# Patient Record
Sex: MALE | Race: OTHER | HISPANIC OR LATINO | URBAN - METROPOLITAN AREA
[De-identification: names, ages, dates, MRNs, and addresses within clinical notes are randomized per-mention and may not be internally consistent; named-entity substitution may affect disease eponyms.]

---

## 2018-08-19 ENCOUNTER — EMERGENCY (EMERGENCY)
Facility: HOSPITAL | Age: 33
LOS: 1 days | Discharge: HOME | End: 2018-08-19
Attending: EMERGENCY MEDICINE | Admitting: EMERGENCY MEDICINE
Payer: SELF-PAY

## 2018-08-19 VITALS
SYSTOLIC BLOOD PRESSURE: 125 MMHG | TEMPERATURE: 98 F | WEIGHT: 315 LBS | RESPIRATION RATE: 16 BRPM | DIASTOLIC BLOOD PRESSURE: 83 MMHG | OXYGEN SATURATION: 97 % | HEART RATE: 74 BPM

## 2018-08-19 DIAGNOSIS — R06.02 SHORTNESS OF BREATH: ICD-10-CM

## 2018-08-19 DIAGNOSIS — F17.200 NICOTINE DEPENDENCE, UNSPECIFIED, UNCOMPLICATED: ICD-10-CM

## 2018-08-19 DIAGNOSIS — M79.89 OTHER SPECIFIED SOFT TISSUE DISORDERS: ICD-10-CM

## 2018-08-19 LAB
ALBUMIN SERPL ELPH-MCNC: 3.7 G/DL — SIGNIFICANT CHANGE UP (ref 3.4–5)
ALP SERPL-CCNC: 61 U/L — SIGNIFICANT CHANGE UP (ref 40–120)
ALT FLD-CCNC: 34 U/L — SIGNIFICANT CHANGE UP (ref 12–42)
ANION GAP SERPL CALC-SCNC: 6 MMOL/L — LOW (ref 9–16)
AST SERPL-CCNC: 18 U/L — SIGNIFICANT CHANGE UP (ref 15–37)
BILIRUB SERPL-MCNC: 0.5 MG/DL — SIGNIFICANT CHANGE UP (ref 0.2–1.2)
BUN SERPL-MCNC: 12 MG/DL — SIGNIFICANT CHANGE UP (ref 7–23)
CALCIUM SERPL-MCNC: 9.1 MG/DL — SIGNIFICANT CHANGE UP (ref 8.5–10.5)
CHLORIDE SERPL-SCNC: 105 MMOL/L — SIGNIFICANT CHANGE UP (ref 96–108)
CO2 SERPL-SCNC: 31 MMOL/L — SIGNIFICANT CHANGE UP (ref 22–31)
CREAT SERPL-MCNC: 0.99 MG/DL — SIGNIFICANT CHANGE UP (ref 0.5–1.3)
D DIMER BLD IA.RAPID-MCNC: 211 NG/ML DDU — SIGNIFICANT CHANGE UP
GLUCOSE SERPL-MCNC: 82 MG/DL — SIGNIFICANT CHANGE UP (ref 70–99)
HCT VFR BLD CALC: 45.6 % — SIGNIFICANT CHANGE UP (ref 39–50)
HGB BLD-MCNC: 15.2 G/DL — SIGNIFICANT CHANGE UP (ref 13–17)
MCHC RBC-ENTMCNC: 29 PG — SIGNIFICANT CHANGE UP (ref 27–34)
MCHC RBC-ENTMCNC: 33.3 G/DL — SIGNIFICANT CHANGE UP (ref 32–36)
MCV RBC AUTO: 86.9 FL — SIGNIFICANT CHANGE UP (ref 80–100)
NT-PROBNP SERPL-SCNC: 30 PG/ML — SIGNIFICANT CHANGE UP
PLATELET # BLD AUTO: 236 K/UL — SIGNIFICANT CHANGE UP (ref 150–400)
POTASSIUM SERPL-MCNC: 4.2 MMOL/L — SIGNIFICANT CHANGE UP (ref 3.5–5.3)
POTASSIUM SERPL-SCNC: 4.2 MMOL/L — SIGNIFICANT CHANGE UP (ref 3.5–5.3)
PROT SERPL-MCNC: 7.8 G/DL — SIGNIFICANT CHANGE UP (ref 6.4–8.2)
RBC # BLD: 5.25 M/UL — SIGNIFICANT CHANGE UP (ref 4.2–5.8)
RBC # FLD: 12.4 % — SIGNIFICANT CHANGE UP (ref 10.3–16.9)
SODIUM SERPL-SCNC: 142 MMOL/L — SIGNIFICANT CHANGE UP (ref 132–145)
TROPONIN I SERPL-MCNC: <0.017 NG/ML — LOW (ref 0.02–0.06)
WBC # BLD: 11.8 K/UL — HIGH (ref 3.8–10.5)
WBC # FLD AUTO: 11.8 K/UL — HIGH (ref 3.8–10.5)

## 2018-08-19 PROCEDURE — 93010 ELECTROCARDIOGRAM REPORT: CPT

## 2018-08-19 PROCEDURE — 71046 X-RAY EXAM CHEST 2 VIEWS: CPT | Mod: 26

## 2018-08-19 PROCEDURE — 93971 EXTREMITY STUDY: CPT | Mod: 26,RT

## 2018-08-19 PROCEDURE — 99285 EMERGENCY DEPT VISIT HI MDM: CPT | Mod: 25

## 2018-08-19 RX ORDER — SODIUM CHLORIDE 9 MG/ML
3 INJECTION INTRAMUSCULAR; INTRAVENOUS; SUBCUTANEOUS EVERY 8 HOURS
Qty: 0 | Refills: 0 | Status: DISCONTINUED | OUTPATIENT
Start: 2018-08-19 | End: 2018-08-19

## 2018-08-19 NOTE — ED ADULT NURSE NOTE - NSIMPLEMENTINTERV_GEN_ALL_ED
Implemented All Universal Safety Interventions:  [place] to call system. Call bell, personal items and telephone within reach. Instruct patient to call for assistance. Room bathroom lighting operational. Non-slip footwear when patient is off stretcher. Physically safe environment: no spills, clutter or unnecessary equipment. Stretcher in lowest position, wheels locked, appropriate side rails in place.

## 2018-08-19 NOTE — ED PROVIDER NOTE - OBJECTIVE STATEMENT
morbidly obese male smoker presents with two complaints: 1) RLE swelling x 2 weeks, mild, constant.  Denies pain or redness.  Sedentary job.  Denies driving long distances.  2) Shortness of breath > 1 year.  With minimal exertion (unchanged from baseline).  Denies chest pain, orthopnea.  Does not have a PMD.  Denies family hx of PE/DVT.  Denies recent surgery or travel history.  Was telling a friend about his symptoms last week which prompted him to come to the ED today.  Accompanied by mom

## 2018-08-19 NOTE — ED PROVIDER NOTE - DIAGNOSTIC INTERPRETATION
ER Physician: Nolan Martinez  CHEST XRAY INTERPRETATION: lungs clear, heart shadow normal, bony structures intact

## 2018-08-19 NOTE — ED ADULT NURSE NOTE - OBJECTIVE STATEMENT
Patient complaining of RLE swelling and shortness of breath on excretion, patient with a h/o obese and smoking

## 2018-08-19 NOTE — ED PROVIDER NOTE - DISCUSSED CLINICAL AND RADIOLOGICAL FINDINGS WITH, MDM
Male Completion Statement: After discussing his treatment course we decided to discontinue isotretinoin therapy at this time. He shouldn't donate blood for one month after the last dose. He should call with any new symptoms of depression. patient

## 2018-08-19 NOTE — ED PROVIDER NOTE - PROGRESS NOTE DETAILS
labs unremarkable.  CXR clear, no DVT seen on US.  D/w patient in detail.  Symptoms likely due to sedentary job/body habitus.  Will give referral for PMD.  Conservative management discussed with the patient in detail.  Close PMD follow up encouraged.  Strict ED return instructions discussed in detail and patient given the opportunity to ask any questions about their discharge diagnosis and instructions

## 2018-08-19 NOTE — ED PROVIDER NOTE - MEDICAL DECISION MAKING DETAILS
SOB/AYALA present > 1 year.  No hypoxia or tachycardia.  Normal WOB, no tachypnea, clear lungs.  RLE swelling new as per patient x 2 weeks without DVT risk factors.  1+ pitting edema symmetric with normal distal neurovascular exam and no calf tenderness.  EKG, labs, cxr, d-dimer, US ordered SOB/AYALA present > 1 year.  No hypoxia or tachycardia.  Normal WOB, no tachypnea, clear lungs.  RLE swelling new as per patient x 2 weeks without DVT risk factors.  1+ pitting edema symmetric with normal distal neurovascular exam and no calf tenderness.  EKG, labs, cxr, d-dimer, US ordered.  Very low suspicion for PE/DVT.  No exam findings to suggest chf, pna.

## 2018-08-19 NOTE — ED PROVIDER NOTE - RESPIRATORY, MLM
Breath sounds clear and equal bilaterally.  Normal WOB, no tachypnea, speaking in full/clear sentences.

## 2019-12-21 ENCOUNTER — EMERGENCY (EMERGENCY)
Facility: HOSPITAL | Age: 34
LOS: 1 days | Discharge: HOME | End: 2019-12-21
Admitting: EMERGENCY MEDICINE
Payer: SELF-PAY

## 2019-12-21 VITALS
DIASTOLIC BLOOD PRESSURE: 80 MMHG | RESPIRATION RATE: 18 BRPM | HEIGHT: 73 IN | HEART RATE: 87 BPM | TEMPERATURE: 98 F | SYSTOLIC BLOOD PRESSURE: 129 MMHG | WEIGHT: 315 LBS | OXYGEN SATURATION: 97 %

## 2019-12-21 VITALS
DIASTOLIC BLOOD PRESSURE: 84 MMHG | OXYGEN SATURATION: 98 % | SYSTOLIC BLOOD PRESSURE: 139 MMHG | TEMPERATURE: 98 F | HEART RATE: 70 BPM | RESPIRATION RATE: 20 BRPM

## 2019-12-21 LAB
ALBUMIN SERPL ELPH-MCNC: 3.7 G/DL — SIGNIFICANT CHANGE UP (ref 3.4–5)
ALP SERPL-CCNC: 52 U/L — SIGNIFICANT CHANGE UP (ref 40–120)
ALT FLD-CCNC: 47 U/L — HIGH (ref 12–42)
ANION GAP SERPL CALC-SCNC: 4 MMOL/L — LOW (ref 9–16)
AST SERPL-CCNC: 33 U/L — SIGNIFICANT CHANGE UP (ref 15–37)
BILIRUB SERPL-MCNC: 0.4 MG/DL — SIGNIFICANT CHANGE UP (ref 0.2–1.2)
BUN SERPL-MCNC: 11 MG/DL — SIGNIFICANT CHANGE UP (ref 7–23)
CALCIUM SERPL-MCNC: 8.4 MG/DL — LOW (ref 8.5–10.5)
CHLORIDE SERPL-SCNC: 101 MMOL/L — SIGNIFICANT CHANGE UP (ref 96–108)
CO2 SERPL-SCNC: 35 MMOL/L — HIGH (ref 22–31)
CREAT SERPL-MCNC: 0.95 MG/DL — SIGNIFICANT CHANGE UP (ref 0.5–1.3)
GLUCOSE SERPL-MCNC: 101 MG/DL — HIGH (ref 70–99)
HCT VFR BLD CALC: 46.2 % — SIGNIFICANT CHANGE UP (ref 39–50)
HGB BLD-MCNC: 15.7 G/DL — SIGNIFICANT CHANGE UP (ref 13–17)
MAGNESIUM SERPL-MCNC: 2.3 MG/DL — SIGNIFICANT CHANGE UP (ref 1.6–2.6)
MCHC RBC-ENTMCNC: 29.1 PG — SIGNIFICANT CHANGE UP (ref 27–34)
MCHC RBC-ENTMCNC: 34 GM/DL — SIGNIFICANT CHANGE UP (ref 32–36)
MCV RBC AUTO: 85.6 FL — SIGNIFICANT CHANGE UP (ref 80–100)
NRBC # BLD: 0 /100 WBCS — SIGNIFICANT CHANGE UP (ref 0–0)
PLATELET # BLD AUTO: 169 K/UL — SIGNIFICANT CHANGE UP (ref 150–400)
POTASSIUM SERPL-MCNC: 3.7 MMOL/L — SIGNIFICANT CHANGE UP (ref 3.5–5.3)
POTASSIUM SERPL-SCNC: 3.7 MMOL/L — SIGNIFICANT CHANGE UP (ref 3.5–5.3)
PROT SERPL-MCNC: 7.8 G/DL — SIGNIFICANT CHANGE UP (ref 6.4–8.2)
RBC # BLD: 5.4 M/UL — SIGNIFICANT CHANGE UP (ref 4.2–5.8)
RBC # FLD: 12.1 % — SIGNIFICANT CHANGE UP (ref 10.3–14.5)
SODIUM SERPL-SCNC: 140 MMOL/L — SIGNIFICANT CHANGE UP (ref 132–145)
TROPONIN I SERPL-MCNC: <0.017 NG/ML — LOW (ref 0.02–0.06)
TSH SERPL-MCNC: 0.8 UIU/ML — SIGNIFICANT CHANGE UP (ref 0.36–3.74)
WBC # BLD: 5.12 K/UL — SIGNIFICANT CHANGE UP (ref 3.8–10.5)
WBC # FLD AUTO: 5.12 K/UL — SIGNIFICANT CHANGE UP (ref 3.8–10.5)

## 2019-12-21 PROCEDURE — 99285 EMERGENCY DEPT VISIT HI MDM: CPT | Mod: 25

## 2019-12-21 PROCEDURE — 71046 X-RAY EXAM CHEST 2 VIEWS: CPT | Mod: 26

## 2019-12-21 PROCEDURE — 93010 ELECTROCARDIOGRAM REPORT: CPT

## 2019-12-21 NOTE — ED PROVIDER NOTE - PMH
GERD (gastroesophageal reflux disease)    No pertinent past medical history GERD (gastroesophageal reflux disease)

## 2019-12-21 NOTE — ED PROVIDER NOTE - PATIENT PORTAL LINK FT
You can access the FollowMyHealth Patient Portal offered by [facility] by registering at the following website: [email or web address]. By joining [facility]’s FollowMyHealth portal, you will also be able to view your health information using other applications (apps) compatible with our system.

## 2019-12-21 NOTE — ED ADULT NURSE NOTE - CHIEF COMPLAINT QUOTE
"My heart feels like its twitching or something, I'm also feel light headed and dizzy, and I feel like I'm easily distracted." c/o non radiating chest pain, no resp distress, no n/v. additionally c/o cough/cold symptoms x1week, nonproductive cough, tactile fever at home. well appearing. nad. aox3 verbal, norman. accompanied with mother. hx asthma

## 2019-12-21 NOTE — ED PROVIDER NOTE - CARE PROVIDER_API CALL
Terence Cook)  Cardiovascular Disease  [address] [facility] [place] [address]  Phone: [phone]  Fax: [phone]  Follow Up Time:

## 2019-12-21 NOTE — ED PROVIDER NOTE - DIAGNOSTIC INTERPRETATION
Interpreted by ED YIN Bonilla   Chest x-ray, 2 views  Mild cardiomegaly. No PNA or acute processes noted.

## 2019-12-21 NOTE — ED ADULT NURSE NOTE - OBJECTIVE STATEMENT
Pt with c/o intermittent palpitations x2 months. Reports experiencing intermittent dizziness since yesterday.  Calm and conversant in ED. No palpitations at time of assessment

## 2019-12-21 NOTE — ED PROVIDER NOTE - TIMING
Received phone call from nurse that pt did not stop Plavix will need to be rescheduled.  Contacted pt at [phone] she apologized that she forgot to stop it.  Rescheduled from [id number] to [id number] same time of 1100.  Staff messages to [facility] Scheduling Pool and  PreAuth pool.     gradual onset

## 2019-12-21 NOTE — ED PROVIDER NOTE - CLINICAL SUMMARY MEDICAL DECISION MAKING FREE TEXT BOX
Pt with 2 months of intermittent palpitations. Seen outpatient by PCP and in ED 3 days ago with normal workups. States earlier this week he had flu-like sxs which have since resolved - however episodes of palpitations have increased. Denies shortness of breath, syncope, near syncope. Will get EKG, CXR, trop, mag, cbc, cmp. Will re-eval. likely will need outpatient cards f/u

## 2019-12-21 NOTE — ED PROVIDER NOTE - NSFOLLOWUPINSTRUCTIONS_ED_ALL_ED_FT
-PLEASE FOLLOW-UP WITH YOUR PRIMARY CARE DOCTOR IN 1-2 DAYS.  BRING ALL PAPERWORK FROM TODAY'S VISIT TO YOUR FOLLOW-UP VISIT.  IF YOU DO NOT HAVE A PRIMARY CARE DOCTOR PLEASE REFER TO THE OFFICE/CLINIC INFORMATION GIVEN ABOVE.  YOU MAY ALSO CALL [phone] AND ASK FOR MSAlejandro SOHAIL KARIMI.  SHE CAN HELP YOU MAKE A FOLLOW-UP APPOINTMENT.  HER HOURS ARE 11AM-7PM MONDAY - FRIDAY.  -TAKE OVER THE COUNTER TYLENOL 650MG BY MOUTH EVERY 4-6 HOURS AS NEEDED FOR PAIN.  DO NOT MIX WITH ALCOHOL OR OTHER PRESCRIPTION MEDICATIONS THAT ALREADY CONTAIN TYLENOL OR ACETAMINOPHEN.   -TAKE OVER THE COUNTER IBUPROFEN 400-600MG BY MOUTH EVERY 8 HOURS AS NEEDED FOR PAIN.  BE SURE TO TAKE WITH FOOD OR MILK AS THIS MEDICATION CAN CAUSE STOMACH IRRITATION.  -PLEASE RETURN TO THE ER IMMEDIATELY OR CALL 911 FOR ANY HIGH FEVER, TROUBLE BREATHING, VOMITING, SEVERE PAIN, OR ANY OTHER CONCERNS.     Palpitations    A palpitation is the feeling that your heartbeat is irregular or is faster than normal. It may feel like your heart is fluttering or skipping a beat. They may be caused by many things, including smoking, caffeine, alcohol, stress, and certain medicines. Although most causes of palpitations are not serious, palpitations can be a sign of a serious medical problem. Avoid caffeine, alcohol, and tobacco products at home. Try to reduce stress and anxiety and make sure to get plenty of rest.     SEEK IMMEDIATE MEDICAL CARE IF YOU HAVE ANY OF THE FOLLOWING SYMPTOMS: chest pain, shortness of breath, severe headache, dizziness/lightheadedness, or fainting.

## 2019-12-21 NOTE — ED PROVIDER NOTE - OBJECTIVE STATEMENT
33 y/o male with PMHx of GERD (taking Omeprazole).     Pt presents to the ED with complaints of non-radiating chest pain x 3 months. Pt states 6 days ago, he developed flu-like symptoms, including non-productive cough, subjective fevers, generalized weakness, decrease appetite, and body aches, which have since subsided. In the interim, Pt endorses taking several doses of DayQuil and was seen at [facility] yesterday for concerns of liver damage. Today, Pt is c/o persistent palpitations and intermittent lightheadedness, which is independent of the palpitations.           Denies fever, chills, SOB, abdominal pain, nausea, vomiting 33 y/o male with PMHx of GERD (taking Omeprazole).     Pt presents to the ED with complaints of non-radiating chest pain x 3 months. Pt states 6 days ago, he developed flu-like symptoms, including non-productive cough, tactile fevers, generalized weakness, decreased appetite, and body aches, which have since subsided. In the interim, Pt endorses taking several doses of DayQuil and was seen at [facility] yesterday for concerns of liver damage. Today, Pt is c/o persistent palpitations and intermittent lightheadedness that is independent of the palpitations, which prompted ED visit today. Of note, Pt states he has been having palpitations since September in the setting of vaping use with nicotine, which he has currently stopped. Initially, palpitation were constant but have become intermittent over the past 2 weeks. Episodes lasted between 10-15 mins and after every few beats. Denies orthostatic hypotension. Denies fever, chills, SOB, abdominal pain, nausea, vomiting, headache.

## 2019-12-21 NOTE — ED ADULT TRIAGE NOTE - CHIEF COMPLAINT QUOTE
"My heart feels like its twitching or something, I'm also feel light headed and dizzy, also easily distracted." c/o non radiating chest pain, no resp distress, no n/v. additionally c/o cough/cold symptoms x1week, nonproductive cough, tactile fever at home. well appearing. nad. accompanied with mother. hx asthma "My heart feels like its twitching or something, I'm also feel light headed and dizzy, and I feel like I'm easily distracted." c/o non radiating chest pain, no resp distress, no n/v. additionally c/o cough/cold symptoms x1week, nonproductive cough, tactile fever at home. well appearing. nad. accompanied with mother. hx asthma "My heart feels like its twitching or something, I'm also feel light headed and dizzy, and I feel like I'm easily distracted." c/o non radiating chest pain, no resp distress, no n/v. additionally c/o cough/cold symptoms x1week, nonproductive cough, tactile fever at home. well appearing. nad. aox3 verbal, norman. accompanied with mother. hx asthma

## 2019-12-21 NOTE — ED PROVIDER NOTE - PROGRESS NOTE DETAILS
EKG, CXR, trop, and labs all unremarkable. Tele normal during stay. Will Dc at this time with strict return precautions. Will have pt follow up with Dr. Cook for possible Holter monitor. Pt has not had palpitations since prior to arrival. States he is feeling better and will f/u outpatient.

## 2019-12-23 PROBLEM — K21.9 GASTRO-ESOPHAGEAL REFLUX DISEASE WITHOUT ESOPHAGITIS: Chronic | Status: ACTIVE | Noted: 2019-12-21

## 2019-12-24 PROBLEM — Z00.00 ENCOUNTER FOR PREVENTIVE HEALTH EXAMINATION: Status: ACTIVE | Noted: 2019-12-24

## 2019-12-25 DIAGNOSIS — Z87.891 PERSONAL HISTORY OF NICOTINE DEPENDENCE: ICD-10-CM

## 2019-12-25 DIAGNOSIS — R42 DIZZINESS AND GIDDINESS: ICD-10-CM

## 2019-12-25 DIAGNOSIS — R07.9 CHEST PAIN, UNSPECIFIED: ICD-10-CM

## 2019-12-25 DIAGNOSIS — R00.2 PALPITATIONS: ICD-10-CM

## 2019-12-25 DIAGNOSIS — R53.1 WEAKNESS: ICD-10-CM

## 2020-01-02 ENCOUNTER — APPOINTMENT (OUTPATIENT)
Dept: HEART AND VASCULAR | Facility: CLINIC | Age: 35
End: 2020-01-02

## 2020-01-03 ENCOUNTER — APPOINTMENT (OUTPATIENT)
Dept: HEART AND VASCULAR | Facility: CLINIC | Age: 35
End: 2020-01-03

## 2020-05-08 ENCOUNTER — EMERGENCY (EMERGENCY)
Facility: HOSPITAL | Age: 35
LOS: 1 days | Discharge: HOME | End: 2020-05-08
Attending: EMERGENCY MEDICINE | Admitting: EMERGENCY MEDICINE
Payer: SELF-PAY

## 2020-05-08 VITALS
TEMPERATURE: 98 F | OXYGEN SATURATION: 98 % | RESPIRATION RATE: 18 BRPM | SYSTOLIC BLOOD PRESSURE: 120 MMHG | DIASTOLIC BLOOD PRESSURE: 80 MMHG | HEART RATE: 86 BPM | HEIGHT: 73 IN | WEIGHT: 315 LBS
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
## 2020-05-08 DIAGNOSIS — T78.1XXA OTHER ADVERSE FOOD REACTIONS, NOT ELSEWHERE CLASSIFIED, INITIAL ENCOUNTER: ICD-10-CM

## 2020-05-08 PROCEDURE — 99284 EMERGENCY DEPT VISIT MOD MDM: CPT

## 2020-05-08 RX ORDER — DEXAMETHASONE 0.5 MG/5ML
10 ELIXIR ORAL ONCE
Refills: 0 | Status: COMPLETED | OUTPATIENT
Start: 2020-05-08 | End: 2020-05-08

## 2020-05-08 RX ORDER — FAMOTIDINE 10 MG/ML
20 INJECTION INTRAVENOUS ONCE
Refills: 0 | Status: COMPLETED | OUTPATIENT
Start: 2020-05-08 | End: 2020-05-08

## 2020-05-08 RX ADMIN — Medication 10 MILLIGRAM(S): at 20:02

## 2020-05-08 RX ADMIN — FAMOTIDINE 20 MILLIGRAM(S): 10 INJECTION INTRAVENOUS at 20:02

## 2020-05-08 NOTE — ED ADULT NURSE NOTE - CHPI ED NUR SYMPTOMS NEG
no shortness of breath/no rash/no throat itching/no difficulty breathing/no congestion/no nausea/no difficulty swallowing/no wheezing

## 2020-05-08 NOTE — ED PROVIDER NOTE - NSFOLLOWUPINSTRUCTIONS_ED_ALL_ED_FT
ArabicEnglishSpanish    Substitutions for Common Food Allergies  Use this guide to help you find substitutes for the foods to which you are allergic. When choosing substitutes, remember to always read food labels. Check to make sure that the substitute does not have any ingredients to which you are allergic. Look for any information about whether a substitute was made in a facility where it might have come into contact with something to which you are allergic.  What are some dairy substitutes?  Substitutes for cow's milk and foods that contain cow's milk include:  Soy milk.[place] milk.Rice milk.Nondairy products, such as nondairy creamer.Milk-free margarine.Dairy-free cheese.Dairy-free yogurt.When you cook or bake, use water or fruit juice as a substitute for milk.  What are some wheat substitutes?  If you have an allergy to wheat, you may also need to avoid foods that contain a protein called gluten, which is found in wheat. Gluten can be found in several kinds of grains, including rye and barley.  Substitutes for wheat and foods that have gluten include:  Products that are labeled "gluten-free."Oatmeal that is labeled “uncontaminated.”Rice.Amaranth.Quinoa.Millet.When you cook or bake, use one of these substitutes for 1 cup of wheat flour:  1 cup of corn flour.1 cup of soy flour with ¼ cup of potato starch flour.5/8 cup of potato starch flour.7/8 cup of rice flour.What are some nut or seed substitutes?  Substitutes for peanut butter and other nut butters include soy nut butter. To make trail mix, use granola, dried fruit, and chocolate chips instead of nuts and seeds.  What are some egg substitutes?  When you cook, try using an egg-free replacer or try scrambled tofu as a substitute for scrambled eggs. When you bake, use one of these combinations as a substitute for one eg tsp of baking powder, 1 Tbsp of liquid, and 1 Tbsp of vinegar.1 tsp of yeast in ¼ cup of water.1½ Tbsp of water, 1½ Tbsp of oil, and 1 tsp of baking powder.1 packet of plain gelatin and 2 Tbsp of water. Mix just before using.What are some soy substitutes?  Soy is often found in processed foods. Substitutes for soy and products that have soy include:  Lean sources of protein, such as fish, poultry, pork, or beans.[place] or rice milk.Fruit, cheese, or homemade crackers.What are some seafood substitutes?  Substitutes for fish and shellfish include:  Lean meat, poultry, tofu, or beans.Chicken or vegetable stock.Always read food labels to make sure that there are no fish ingredients in your food, especially in the sauces.  What are some MSG substitutes?  MSG is often added to prepackaged snacks and food items. Substitutes for foods that have MSG include:  Fresh, whole foods.Snacks that are flavored with salt or herbs.Food that is prepared from scratch.What are some sulfite substitutes?  The Food and Drug Administration (FDA) requires that sulfites be listed on labels. Be aware, however, that some foods and beverages such as wine may contain sulfites in very small (incidental) amounts, which are not required on an ingredient list. Also avoid sulfur dioxide, sodium sulfite, sodium bisulfite, potassium bisulfite, sodium metabisulfite, or potassium metabisulfite.  Instead of foods that could include sulfites, such as dried fruit or potato flakes, choose:  Fresh fruit.Whole potatoes.Products that are labeled "sulfite-free."This information is not intended to replace advice given to you by your health care provider. Make sure you discuss any questions you have with your health care provider.    Document Released: 2016 Document Revised: 2017 Document Reviewed: 2016  [employer] Patient Education ©  [employer]

## 2020-05-08 NOTE — ED PROVIDER NOTE - OBJECTIVE STATEMENT
33 yo M with history of fatty liver told to come in to the ED by PMD for evaluation after patient reported allergic reaction of throat swelling and vomiting after eating food at 4:30pm. Onset of symptoms began at 5pm but has subsided since. Patient now denies any complaints other than after he vomited, he felt a mild RUQ discomfort when breathing in heavily but denies any chest pain, current n/v/d, abdominal pain radiating to shoulder or the back, recent surgeries, headache, dizziness, LOC, trauma, LE swelling, hemoptysis, family history of early cardiac death, or childhood history of cardiovascular problems or unexplained syncope.

## 2020-05-08 NOTE — ED PROVIDER NOTE - CLINICAL SUMMARY MEDICAL DECISION MAKING FREE TEXT BOX
Well appearing patient ambulating with steady, unassisted brisk gait in the ED with no current signs of angioedema, throat swelling, or anterior swelling of oralpharyngeal airway. Patient is speaking full, clear, uniterrupted sentences without delay and can hold his breath for 10+ seconds. Abdomen is nontender to palpation in RUQ and there is no CVA tenderness. No risk for ACS, AAA, or dissection and vitals WNL. Will give IM decadron and pepcid and monitor in the ED for 4-6 hours after initial onset of symptoms at 5pm.

## 2020-05-08 NOTE — ED ADULT TRIAGE NOTE - CHIEF COMPLAINT QUOTE
Pt walked into ED c.o allergic reaction. Pt states "I ate a chicken wrap and then a little while after my eyes started to feel dry and I started having a sneezing fit then the pack of my throat started to itch". Pt state he took Benadryl 50mg but then threw up 30 min after. Pt states attack started at 5pm. Pt has noted swelling to bl eyes.

## 2020-05-08 NOTE — ED PROVIDER NOTE - PATIENT PORTAL LINK FT
You can access the FollowMyHealth Patient Portal offered by [facility] by registering at the following website: [email or web address]. By joining [employer]’s FollowMyHealth portal, you will also be able to view your health information using other applications (apps) compatible with our system.

## 2020-05-08 NOTE — ED ADULT NURSE NOTE - NSIMPLEMENTINTERV_GEN_ALL_ED
Implemented All Universal Safety Interventions:  [place] to call system. Call bell, personal items and telephone within reach. Instruct patient to call for assistance. Room bathroom lighting operational. Non-slip footwear when patient is off stretcher. Physically safe environment: no spills, clutter or unnecessary equipment. Stretcher in lowest position, wheels locked, appropriate side rails in place.

## 2020-05-08 NOTE — ED ADULT NURSE NOTE - OBJECTIVE STATEMENT
35 yo M c/o allergic reaction. Pt states he had a chicken wrap and at one point began feeling his eyelids swell. Pt states he became diaphoretic at the time, and then took 50 mg of Benadryl. Reports relief with benadryl but states PCP advised him to seek attention in the ED. Vomited x2 after taking the Benadryl. Denies SOB, throat discomfort. No hives noted, edema to eyes noted. No edema to lips, throat, tongue. Denies CP, SOB, N/D, headache, dizziness, fever/chills, numbness/tingling, change in bowel or bladder habits. Pt speaking in full complete sentences, ambulatory with steady gait.

## 2020-05-08 NOTE — ED PROVIDER NOTE - PROGRESS NOTE DETAILS
Patient declines bloodwork as he reports he already has a negative LFT test recently and will instead follow up with his PCP and an immunologist for allergy testing. Repeat abd exam negative for tenderness. Currently tolerating PO.

## 2020-05-08 NOTE — ED PROVIDER NOTE - ENMT, MLM
Clear airway, normal tongue, no signs of angioedema, Airway patent, Nasal mucosa clear. Mouth with normal mucosa. Throat has no vesicles, no oropharyngeal exudates and uvula is midline.

## 2023-05-16 NOTE — ED ADULT TRIAGE NOTE - SPO2 (%)
97 Oral Minoxidil Counseling- I discussed with the patient the risks of oral minoxidil including but not limited to shortness of breath, swelling of the feet or ankles, dizziness, lightheadedness, unwanted hair growth and allergic reaction.  The patient verbalized understanding of the proper use and possible adverse effects of oral minoxidil.  All of the patient's questions and concerns were addressed.